# Patient Record
Sex: FEMALE | Race: BLACK OR AFRICAN AMERICAN
[De-identification: names, ages, dates, MRNs, and addresses within clinical notes are randomized per-mention and may not be internally consistent; named-entity substitution may affect disease eponyms.]

---

## 2020-07-24 ENCOUNTER — HOSPITAL ENCOUNTER (INPATIENT)
Dept: HOSPITAL 74 - YASAS | Age: 50
LOS: 5 days | Discharge: OTHER INSTITUTION | End: 2020-07-29
Attending: ALLERGY & IMMUNOLOGY | Admitting: ALLERGY & IMMUNOLOGY
Payer: COMMERCIAL

## 2020-07-24 VITALS — BODY MASS INDEX: 46.3 KG/M2

## 2020-07-24 DIAGNOSIS — F14.21: ICD-10-CM

## 2020-07-24 DIAGNOSIS — I48.91: ICD-10-CM

## 2020-07-24 DIAGNOSIS — F41.9: ICD-10-CM

## 2020-07-24 DIAGNOSIS — I10: ICD-10-CM

## 2020-07-24 DIAGNOSIS — F10.24: ICD-10-CM

## 2020-07-24 DIAGNOSIS — F32.9: ICD-10-CM

## 2020-07-24 DIAGNOSIS — H91.93: ICD-10-CM

## 2020-07-24 DIAGNOSIS — Z56.0: ICD-10-CM

## 2020-07-24 DIAGNOSIS — Z99.89: ICD-10-CM

## 2020-07-24 DIAGNOSIS — M17.0: ICD-10-CM

## 2020-07-24 DIAGNOSIS — F19.24: ICD-10-CM

## 2020-07-24 DIAGNOSIS — Z98.84: ICD-10-CM

## 2020-07-24 DIAGNOSIS — F10.282: ICD-10-CM

## 2020-07-24 DIAGNOSIS — D33.3: ICD-10-CM

## 2020-07-24 DIAGNOSIS — F10.230: Primary | ICD-10-CM

## 2020-07-24 LAB
ALBUMIN SERPL-MCNC: 3.6 G/DL (ref 3.4–5)
ALP SERPL-CCNC: 121 U/L (ref 45–117)
ALT SERPL-CCNC: 53 U/L (ref 13–61)
ANION GAP SERPL CALC-SCNC: 8 MMOL/L (ref 8–16)
AST SERPL-CCNC: 61 U/L (ref 15–37)
BILIRUB SERPL-MCNC: 0.9 MG/DL (ref 0.2–1)
BUN SERPL-MCNC: 9 MG/DL (ref 7–18)
CALCIUM SERPL-MCNC: 9.2 MG/DL (ref 8.5–10.1)
CHLORIDE SERPL-SCNC: 98 MMOL/L (ref 98–107)
CO2 SERPL-SCNC: 33 MMOL/L (ref 21–32)
CREAT SERPL-MCNC: 0.9 MG/DL (ref 0.55–1.3)
DEPRECATED RDW RBC AUTO: 16.3 % (ref 11.6–15.6)
GLUCOSE SERPL-MCNC: 106 MG/DL (ref 74–106)
HCT VFR BLD CALC: 39.4 % (ref 32.4–45.2)
HGB BLD-MCNC: 13 GM/DL (ref 10.7–15.3)
MCH RBC QN AUTO: 29.4 PG (ref 25.7–33.7)
MCHC RBC AUTO-ENTMCNC: 32.9 G/DL (ref 32–36)
MCV RBC: 89.4 FL (ref 80–96)
PLATELET # BLD AUTO: 144 K/MM3 (ref 134–434)
PMV BLD: 10.9 FL (ref 7.5–11.1)
POTASSIUM SERPLBLD-SCNC: 3.9 MMOL/L (ref 3.5–5.1)
PROT SERPL-MCNC: 7.3 G/DL (ref 6.4–8.2)
RBC # BLD AUTO: 4.41 M/MM3 (ref 3.6–5.2)
SODIUM SERPL-SCNC: 138 MMOL/L (ref 136–145)
WBC # BLD AUTO: 6.4 K/MM3 (ref 4–10)

## 2020-07-24 PROCEDURE — U0003 INFECTIOUS AGENT DETECTION BY NUCLEIC ACID (DNA OR RNA); SEVERE ACUTE RESPIRATORY SYNDROME CORONAVIRUS 2 (SARS-COV-2) (CORONAVIRUS DISEASE [COVID-19]), AMPLIFIED PROBE TECHNIQUE, MAKING USE OF HIGH THROUGHPUT TECHNOLOGIES AS DESCRIBED BY CMS-2020-01-R: HCPCS

## 2020-07-24 PROCEDURE — HZ2ZZZZ DETOXIFICATION SERVICES FOR SUBSTANCE ABUSE TREATMENT: ICD-10-PCS | Performed by: ALLERGY & IMMUNOLOGY

## 2020-07-24 PROCEDURE — G0009 ADMIN PNEUMOCOCCAL VACCINE: HCPCS

## 2020-07-24 RX ADMIN — IBUPROFEN PRN MG: 400 TABLET, FILM COATED ORAL at 17:40

## 2020-07-24 RX ADMIN — Medication SCH MG: at 22:18

## 2020-07-24 RX ADMIN — METOPROLOL TARTRATE SCH MG: 25 TABLET, FILM COATED ORAL at 22:17

## 2020-07-24 RX ADMIN — NICOTINE SCH: 7 PATCH TRANSDERMAL at 16:53

## 2020-07-24 RX ADMIN — Medication SCH MG: at 22:16

## 2020-07-24 SDOH — ECONOMIC STABILITY - INCOME SECURITY: UNEMPLOYMENT, UNSPECIFIED: Z56.0

## 2020-07-24 NOTE — HP
CIWA Score


Nausea/Vomitin-No Nausea/No Vomiting


Muscle Tremors: 3


Anxiety: 6


Agitation: 3


Paroxysmal Sweats: 1-Minimal Palms Moist


Orientation: 0-Oriented


Tacttile Disturbances: 2-Mild Itch/Numbness/Burn


Auditory Disturbances: 0-None


Visual Disturbances: 1-Very Mild Sensitivity


Headache: 0-None Present


CIWA-Ar Total Score: 16





- Admission Criteria


OASAS Guidelines: Admission for Medically Managed Detox: 


Requires at least one of the followin. CIWA greater than 12


2. Seizures within the past 24 hours


3. Delirium tremens within the past 24 hours


4. Hallucinations within the past 24 hours


5. Acute intervention needed for co  occurring medical disorder


6. Acute intervention needed for co  occurring psychiatric disorder


7. Severe withdrawal that cannot be handled at a lower level of care (continued


    vomiting, continued diarrhea, abnormal vital signs) requiring intravenous


    medication and/or fluids


8. Pregnancy








Admitting History and Physical





- Admission


Chief Complaint: " Im here to stop drinking."


History of Present Illness: 





 Patient is a 50 y/o F who presents to [facility] for alcohol detox. Patient 

endorses she consumes 8 glasses of vodka/whiskey daily. Last drink was 5:30 am 

today. Age of first use 16. Patient states she does require an eye opener and 

that she has blacked out multiple times in the past from drinking. 





PMH: Depression (Fluoxetine, Trazadone), HTN, Hearing loss (acoustic 

schwannoma), osteoarthritis b/l knees


Social Hx- Unemployed. Remote history of crack use- May of this year. 


SurgHx- Gastric Sleeve ([facility])


FH- Father Alcoholism, Leukemia. Mother Healthy. 




















 





Substance Use & Tx History





- Substance Use History


  ** Alcohol


Substance amount: 64 ounces vodka


Frequency of use: Daily


Substance route: Oral


Date of Last Use: 20


History Source: Patient


Limitations to Obtaining History: No Limitations





- Past Medical History


CNS: Yes: Other (Acoustic Schwanomma)


Cardiovascular: Yes: HTN


Pulmonary: No: Asthma, Bronchitis, Cancer, COPD, O2 Dependent, Pneumonia, 

Previously Intubated, Pulmonary Embolus, Pulmonary Fibrosis, Sleep Apnea, Other


Gastrointestinal: Yes: Other (Hernia. Unclear which type.)


Hepatobiliary: No: Cirrhosis, Cholelithiasis, Cholecystitis, 

Choledocholithiasis, Hepatitis A, Hepatitis B, Hepatitis C, Other


Renal/: No: Renal Failure, Renal Inusuff, BPH, Cancer, Hematuria, 

Hemodialysis, Neurogenic Bladder, Renal Calculi, UTI, Other


Reproductive: No: Ectopic Pregnancy, Endometriosis, Fibroids, PID, Polycystic 

Ovary Syndrome, Postmenopausal, Other


Heme/Onc: No: Anemia, B12 Deficiency, Bleeding Disorder, Cancer, Current 

Chemotherapy, Current Radiation Therapy, Hemochromatosis, Hypercoaguable State, 

Myeloproliferative Synd, Sickle Cell Disease, Sickle Cell Trait, 

Thrombocytopenia, Other


Infectious Disease: No: AIDS, C-Diff, Herpes Zoster, HIV, MRSA, STD's, 

Tuberculosis, VREF, Other


Psych: Yes: Depression


Musculoskeletal: Yes: Osteoarthritis


Rheumatology: No: Fibromyalgia, Gout, Lupus, Rheumatoid Arthritis, Sarcoidosis, 

Vasculitis, Other


ENT: No: Allergic Rhinitis, Sinusitis, Other


Endocrine: No: Fillmore's Disease, Cushing's Disease, Diabetes Insipidus, 

Diabetes Mellitus, Hyperparathyroidism, Hyperthyroidism, Hypothyroidism, 

Osteopenia, SIADH, Other





- Past Surgical History


Past Surgical History: Yes: Bypass, Cataract Removal





- Smoking History


Have you smoked in the past 12 months: No





- Alcohol/Substance Use


Hx Alcohol Use: Yes





Admission [facility]


Allergies/Adverse Reactions: 


                                    Allergies











Allergy/AdvReac Type Severity Reaction Status Date / Time


 


No Known Allergies Allergy   Verified 20 14:07











Exam Limitations: No Limitations





- Ebola screening


Have you traveled outside of the country in the last 21 days: No


Have you had contact with anyone from an Ebola affected area: No


Have you been sick,other than usual withdrawal symptoms: No


Do you have a fever: No





- Review of Systems


Constitutional: No Symptoms Reported


EENT: reports: Cataracts, Hearing Loss.  denies: Blurred Vision, Double Vision, 

Eye Pain, Ear Pain


Respiratory: denies: Cough, Orthopnea, Shortness of Breath, SOB with Exertion, 

Wheezing, Productive cough, Hemoptysis


Cardiac: denies: Chest Pain, Lightheadedness, Palpitations, Chest Tightness


GI: denies: Blood Streaked Bowels, Constipated, Diarrhea, Poor Appetite, Rectal 

Bleeding, Vomiting


: denies: Burning, Dysuria, Discharge, Flank Pain, Hematuria


Musculoskeletal: denies: Back Pain, Gout, Joint Pain, Muscle Pain, Muscle 

Weakness


Integumentary: denies: Bruising, Change in Color, Change in Hair/Nails, Dryness,

Erythema, Flushing


Neuro: denies: Headache, Numbness, Paresthesia, Seizure, Tingling, Tremors, 

Weakness


Endocrine: denies: Excessive Sweating, Flushing, Intolerance to Cold, 

Intolerance to Heat, Increased Hunger


Hematology: denies: Anemia, Blood Clots, Easy Bleeding, Easy Bruising


Psychiatric: reports: Orientated x3





Patient History





- Smoking Cessation


Smoking history: Former smoker


Have you smoked in the past 12 months: No


Initiated information on smoking cessation: No





Admission Physical Exam [facility]





- Physical


General Appearance: Yes: Within Normal Limits, Nourished, Appropriately Dressed


HEENTM: Yes: EOMI, Normal ENT Inspection, Normocephalic, Normal Voice


Respiratory: Yes: Chest Non-Tender, Lungs Clear, Normal Breath Sounds, No 

Respiratory Distress


Cardiology: Yes: Regular Rhythm, Regular Rate, S1, S2


Abdominal: Yes: Other (Obese).  No: Distended, Guarding, Rebound, Tenderness


Musculoskeletal: Yes: Other (Uses can to ambulate)


Extremities: Yes: Within Normal Limits


Neurological: Yes: CNs II-XII NML intact, Fully Oriented, Alert, Motor Strength 

5/5


Integumentary: Yes: Normal Color, Dry, Warm


Lymphatic: Yes: Within Normal Limits





- Diagnostic


(1) Alcohol abuse


Current Visit: Yes   Status: Acute   





(2) Hypertension


Current Visit: Yes   Status: Acute   





(3) Depression


Current Visit: Yes   Status: Chronic   





(4) Atrial fibrillation


Current Visit: Yes   Status: Chronic   





Breathalyzer





- Breathalyzer


Breathalyzer: 0





Urine Drug Screen





- Test Device


Lot number: [id number]


Expiration date: 21





- Control


Is test valid?: Yes





- Results


Drug screen NEGATIVE: Yes





Inpatient Rehab Admission





- Rehab Decision to Admit


Inpatient rehab admission?: No

## 2020-07-24 NOTE — BHS.RME
Substance Use & Tx History





- Substance Use History


  ** Alcohol


Substance amount: 64 ounces vodka


Frequency of use: Daily


Substance route: Oral


Date of Last Use: 20





Physical/Psych/Mental Status





- Behavior


General Behavior: Increased activity (restlessness, agitation)


Eye Contact: Normal





- Cooperativeness


Cooperativeness: Cooperative





- Thinking


Thought Processes: Tight, Logical, Goal Directed





- Physical Health Problems


Is patient presently having any pain?: No


Does patient presently have any injuries (include location): No


Does patient currently have a fever: No


Is patient pregnant: No





CIWA


Nausea/Vomitin


Muscle Tremors: 4-Moderate,w/Arms Extend


Anxiety: 2


Agitation: 2


Paroxysmal Sweats: 1-Minimal Palms Moist


Orientation: 0-Oriented


Tacttile Disturbances: 2-Mild Itch/Numbness/Burn


Auditory Disturbances: 0-None


Visual Disturbances: 1-Very Mild Sensitivity


Headache: 0-None Present


CIWA-Ar Total Score: 14

## 2020-07-24 NOTE — HP
CIWA Score


Nausea/Vomitin


Muscle Tremors: 4-Moderate,w/Arms Extend


Anxiety: 2


Agitation: 2


Paroxysmal Sweats: 1-Minimal Palms Moist


Orientation: 0-Oriented


Tacttile Disturbances: 2-Mild Itch/Numbness/Burn


Auditory Disturbances: 0-None


Visual Disturbances: 1-Very Mild Sensitivity


Headache: 0-None Present


CIWA-Ar Total Score: 14





- Admission Criteria


OASAS Guidelines: Admission for Medically Managed Detox: 


Requires at least one of the followin. CIWA greater than 12


2. Seizures within the past 24 hours


3. Delirium tremens within the past 24 hours


4. Hallucinations within the past 24 hours


5. Acute intervention needed for co  occurring medical disorder


6. Acute intervention needed for co  occurring psychiatric disorder


7. Severe withdrawal that cannot be handled at a lower level of care (continued


    vomiting, continued diarrhea, abnormal vital signs) requiring intravenous


    medication and/or fluids


8. Pregnancy








Admitting History and Physical





- Admission


History Source: Patient


Limitations to Obtaining History: No Limitations





- Past Medical History


Pulmonary: Yes: Asthma





- Past Surgical History


Past Surgical History: Yes: None





- Smoking History


Smoking history: Current every day smoker


Have you smoked in the past 12 months: Yes


Aproximately how many cigarettes per day: 20





- Alcohol/Substance Use


Hx Alcohol Use: Yes


Number of Drinks Daily: 20


History of Substance Use: reports: Cocaine





- Social History


Usual Living Arrangement: Yes: Other


Do you think of yourself as: Straight/Heterosexual


ADL: Independent


Occupation: unemployed


History of Recent Travel: No





Admission ROS S





- HPI


Exam Limitations: No Limitations





- Ebola screening


Have you traveled outside of the country in the last 21 days: No


Have you had contact with anyone from an Ebola affected area: No


Have you been sick,other than usual withdrawal symptoms: No


Do you have a fever: No





- Review of Systems


Constitutional: Chills, Diaphoresis


EENT: reports: No Symptoms Reported


Respiratory: reports: No Symptoms reported


Cardiac: reports: No Symptoms Reported


GI: reports: No Symptoms Reported


: reports: No Symptoms Reported


Musculoskeletal: reports: No Symptoms Reported


Integumentary: reports: No Symptoms Reported


Neuro: reports: No Symptoms reported


Endocrine: reports: No Symptoms Reported


Hematology: reports: No Symptoms Reported


Psychiatric: reports: Judgement Intact, Mood/Affect Appropiate, Orientated x3, 

Agitated, Anxious


Other Systems: Reviewed and Negative





Patient History





- Patient Medical History


Hx Anemia: No


Hx Asthma: Yes


Hx Chronic Obstructive Pulmonary Disease (COPD): No


Hx Cancer: No


Hx Cardiac Disorders: No


Hx Congestive Heart Failure: No


Hx Hypertension: No


Hx Hypercholesterolemia: No


Hx Pacemaker: No


HX Cerebrovascular Accident: No


Hx Seizures: No


Hx Dementia: No


Hx Diabetes: No


Hx Gastrointestinal Disorders: No


Hx Liver Disease: No


Hx Genitourinary Disorders: No


Hx Sexually Transmitted Disorders: No


Hx Renal Disease (ESRD): No


Hx Thyroid Disease: No


Hx Human Immunodeficiency Virus (HIV): No


Hx Hepatitis C: No


Hx Depression: No


Hx Suicide Attempt: No


Hx Bipolar Disorder: No


Hx Schizophrenia: No





- Patient Surgical History


Past Surgical History: No





- PPD History


Previous Implant?: Yes


Documented Results: Negative w/o proof


Implanted On Prior [facility] Admission?: No


PPD to be Administered?: Yes





- Smoking Cessation


Smoking history: Current every day smoker


Have you smoked in the past 12 months: Yes


Aproximately how many cigarettes per day: 20


Hx Chewing Tobacco Use: No


Initiated information on smoking cessation: Yes


'Breaking Loose' booklet given: 20





- Substances abused


  ** Alcohol


Substance route: Oral


Frequency: Daily


Amount used: 3 pints vodka + beers


Age of first use: 11


Date of last use: 20 (3am beers)





  ** Cocaine


Substance route: Inhalation


Frequency: Daily


Amount used: 1 gram


Age of first use: 11


Date of last use: 20





Admission Physical Exam [facility]





- Physical


General Appearance: Yes: Mild Distress, Thin, Tremorous, Irritable, Anxious


HEENTM: Yes: EOMI, Hearing grossly Normal, Normal ENT Inspection, Normocephalic,

Normal Voice, THA, Pharynx Normal, Tm's normal


Respiratory: Yes: Chest Non-Tender, Lungs Clear, Normal Breath Sounds, No 

Respiratory Distress, No Accessory Muscle Use


Neck: Yes: No masses,lesions,Nodules, Supple, Trachea in good position


Breast: Yes: Within Normal Limits


Cardiology: Yes: Regular Rhythm, Regular Rate, S1, S2


Abdominal: Yes: Normal Bowel Sounds, Non Tender, Flat, Soft


Genitourinary: Yes: Within Normal Limits


Back: Yes: Normal Inspection


Musculoskeletal: Yes: full range of Motion, Gait Steady, Pelvis Stable


Extremities: Yes: Normal Capillary Refill, Normal Inspection, Normal Range of 

Motion, Non-Tender


Neurological: Yes: CNs II-XII NML intact, Fully Oriented, Alert, Motor Strength 

5/5, Normal Mood/Affect, Normal Response


Integumentary: Yes: Normal Color, Dry, Warm


Lymphatic: Yes: Within Normal Limits





Cleared for Admission [facility]





- Detox or Rehab


[facility] Level of Care: Medically Managed


Detox Regimen/Protocol: Librium


Claeared for Rehab Admission: No





Screened but not Admitted





- Documentation of Visit


Screened but not Admitted: No





Breathalyzer





- Breathalyzer


Breathalyzer: 0





Inpatient Rehab Admission





- Rehab Decision to Admit


Inpatient rehab admission?: No

## 2020-07-25 RX ADMIN — Medication SCH MG: at 22:14

## 2020-07-25 RX ADMIN — METHOCARBAMOL PRN MG: 500 TABLET ORAL at 13:35

## 2020-07-25 RX ADMIN — METOPROLOL TARTRATE SCH MG: 25 TABLET, FILM COATED ORAL at 10:43

## 2020-07-25 RX ADMIN — METOPROLOL TARTRATE SCH MG: 25 TABLET, FILM COATED ORAL at 22:14

## 2020-07-25 RX ADMIN — MULTIVITAMIN TABLET SCH TAB: TABLET at 10:44

## 2020-07-25 RX ADMIN — IBUPROFEN PRN MG: 400 TABLET, FILM COATED ORAL at 06:00

## 2020-07-25 RX ADMIN — METHOCARBAMOL PRN MG: 500 TABLET ORAL at 05:59

## 2020-07-25 RX ADMIN — NICOTINE SCH: 7 PATCH TRANSDERMAL at 10:43

## 2020-07-25 NOTE — PN
[facility] Progress Note


Note: 





Psychiatry   


Attending's note :


Three visits at bedside.


Different times. Patient asleep.


Nursing staff made aware.


Liaison-Psychiatry will follow.

## 2020-07-25 NOTE — PN
[facility] Progress Note


Note: 





Psychiatry   


Attending's note :


Bedside visit for psychiatric interview.


Patient is found asleep. No distress noted. 


Examination deferred until patient awake.

## 2020-07-25 NOTE — PN
[facility] CIWA





- CIWA Score


Nausea/Vomiting: 3 (and Diarrhea.)


Muscle Tremors: None


Anxiety: 3


Agitation: 2


Paroxysmal Sweats: No Perspiration


Orientation: 0-Oriented


Tacttile Disturbances: 0-None


Auditory Disturbances: 1-Very Mild


Visual Disturbances: 2-Mild Sensitivity


Headache: 0-None Present


CIWA-Ar Total Score: 11





[facility] Progress Note (SOAP)


Subjective: 





Nausea, Diarrhea, Anxious, Fatigue.


Objective: 


Patient A & O X 3, Observed Ambulating on Detox Unit Unassisted. In No Acute 

Distress.





07/25/20 16:28





                                   Vital Signs











Temperature  96.9 F L  07/25/20 14:12


 


Pulse Rate  89   07/25/20 14:12


 


Respiratory Rate  18   07/25/20 14:12


 


Blood Pressure  134/68   07/25/20 14:12


 


O2 Sat by Pulse Oximetry (%)  96   07/25/20 14:12











                                Laboratory Tests











  07/24/20 07/24/20 07/24/20





  14:15 14:15 14:15


 


WBC  6.4  


 


RBC  4.41  


 


Hgb  13.0  


 


Hct  39.4  


 


MCV  89.4  


 


MCH  29.4  


 


MCHC  32.9  


 


RDW  16.3 H  


 


Plt Count  144  


 


MPV  10.9  


 


Sodium   138 


 


Potassium   3.9 


 


Chloride   98 


 


Carbon Dioxide   33 H 


 


Anion Gap   8 


 


BUN   9.0 


 


Creatinine   0.9 


 


Est GFR (CKD-EPI)AfAm   85.80 


 


Est GFR (CKD-EPI)NonAf   74.03 


 


Random Glucose   106 


 


Hemoglobin A1c %   


 


Calcium   9.2 


 


Total Bilirubin   0.9 


 


AST   61 H 


 


ALT   53 


 


Alkaline Phosphatase   121 H 


 


Total Protein   7.3 


 


Albumin   3.6 


 


Syphilis Serology    Non-reactive


 


HIV Ag/Ab Combo Qual   














  07/24/20 07/25/20





  14:15 06:05


 


WBC  


 


RBC  


 


Hgb  


 


Hct  


 


MCV  


 


MCH  


 


MCHC  


 


RDW  


 


Plt Count  


 


MPV  


 


Sodium  


 


Potassium  


 


Chloride  


 


Carbon Dioxide  


 


Anion Gap  


 


BUN  


 


Creatinine  


 


Est GFR (CKD-EPI)AfAm  


 


Est GFR (CKD-EPI)NonAf  


 


Random Glucose  


 


Hemoglobin A1c %  4.8 


 


Calcium  


 


Total Bilirubin  


 


AST  


 


ALT  


 


Alkaline Phosphatase  


 


Total Protein  


 


Albumin  


 


Syphilis Serology  


 


HIV Ag/Ab Combo Qual   Negative











Lab Results noted.


Assessment: 





07/25/20 16:29


WITHDRAWAL SYMPTOMS.


ELEVATED AST LEVEL.


Plan: 





Continue Detox.


Increase Daily Oral Water Intake.


PRN Zofran SL for nausea.


PRN Pepto-Bismol for Diarrhea.

## 2020-07-26 RX ADMIN — METOPROLOL TARTRATE SCH MG: 25 TABLET, FILM COATED ORAL at 10:23

## 2020-07-26 RX ADMIN — TRAZODONE HYDROCHLORIDE SCH MG: 50 TABLET ORAL at 22:16

## 2020-07-26 RX ADMIN — MULTIVITAMIN TABLET SCH TAB: TABLET at 10:23

## 2020-07-26 RX ADMIN — NICOTINE SCH: 7 PATCH TRANSDERMAL at 10:24

## 2020-07-26 RX ADMIN — Medication SCH MG: at 22:15

## 2020-07-26 RX ADMIN — METOPROLOL TARTRATE SCH MG: 25 TABLET, FILM COATED ORAL at 22:15

## 2020-07-26 RX ADMIN — METHOCARBAMOL PRN MG: 500 TABLET ORAL at 10:27

## 2020-07-26 RX ADMIN — METHOCARBAMOL PRN MG: 500 TABLET ORAL at 22:16
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
## 2020-07-26 NOTE — PN
S CIWA





- CIWA Score


Nausea/Vomitin-Mild Nausea/No Vomiting


Muscle Tremors: 2


Anxiety: 1-Mildly Anxious


Agitation: 1-Slight > Activity


Paroxysmal Sweats: 1-Minimal Palms Moist


Orientation: 0-Oriented


Tacttile Disturbances: 0-None


Auditory Disturbances: 0-None


Visual Disturbances: 2-Mild Sensitivity


Headache: 1-Very Mild


CIWA-Ar Total Score: 9





[facility] Progress Note (SOAP)


Subjective: 





49 years old female admitted on 20 for alcohol withdrawal sx management 

treating with librium detox regiment 


ate breakfast and lunch in room ambulating with cane slow steady 


social with peers in day room 


discussing aftercare with staff ms simmons prefers to go to [facility] for alcohol 

recovery 


Objective: 





20 13:51


                               Vital Signs - 24 hr











  20





  14:12 17:51 21:00


 


Temperature 96.9 F L 98.0 F 97.3 F L


 


Pulse Rate 89 91 H 80


 


Respiratory 18 18 18





Rate   


 


Blood Pressure 134/68 101/67 105/64


 


O2 Sat by Pulse 96  95





Oximetry (%)   














  20





  07:02 08:37 12:18


 


Temperature 96.9 F L 96.9 F L 96.9 F L


 


Pulse Rate 81 91 H 96 H


 


Respiratory 20 18 18





Rate   


 


Blood Pressure 105/69 105/73 116/70


 


O2 Sat by Pulse 97  97





Oximetry (%)   








                                Laboratory Tests











  20





  14:15 14:15 14:15


 


WBC  6.4  


 


RBC  4.41  


 


Hgb  13.0  


 


Hct  39.4  


 


MCV  89.4  


 


MCH  29.4  


 


MCHC  32.9  


 


RDW  16.3 H  


 


Plt Count  144  


 


MPV  10.9  


 


Sodium   138 


 


Potassium   3.9 


 


Chloride   98 


 


Carbon Dioxide   33 H 


 


Anion Gap   8 


 


BUN   9.0 


 


Creatinine   0.9 


 


Est GFR (CKD-EPI)AfAm   85.80 


 


Est GFR (CKD-EPI)NonAf   74.03 


 


Random Glucose   106 


 


Hemoglobin A1c %   


 


Calcium   9.2 


 


Total Bilirubin   0.9 


 


AST   61 H 


 


ALT   53 


 


Alkaline Phosphatase   121 H 


 


Total Protein   7.3 


 


Albumin   3.6 


 


Syphilis Serology    Non-reactive


 


HIV Ag/Ab Combo Qual   














  20





  14:15 06:05


 


WBC  


 


RBC  


 


Hgb  


 


Hct  


 


MCV  


 


MCH  


 


MCHC  


 


RDW  


 


Plt Count  


 


MPV  


 


Sodium  


 


Potassium  


 


Chloride  


 


Carbon Dioxide  


 


Anion Gap  


 


BUN  


 


Creatinine  


 


Est GFR (CKD-EPI)AfAm  


 


Est GFR (CKD-EPI)NonAf  


 


Random Glucose  


 


Hemoglobin A1c %  4.8 


 


Calcium  


 


Total Bilirubin  


 


AST  


 


ALT  


 


Alkaline Phosphatase  


 


Total Protein  


 


Albumin  


 


Syphilis Serology  


 


HIV Ag/Ab Combo Qual   Negative








lab noted


Assessment: 





20 13:52


alcohol withdrawal 


Plan: 





librium regiment

## 2020-07-26 NOTE — EKG
Test Reason : 

Blood Pressure : ***/*** mmHG

Vent. Rate : 074 BPM     Atrial Rate : 074 BPM

   P-R Int : 160 ms          QRS Dur : 096 ms

    QT Int : 460 ms       P-R-T Axes : 046 024 023 degrees

   QTc Int : 510 ms

 

NORMAL SINUS RHYTHM

POSSIBLE LEFT ATRIAL ENLARGEMENT

NONSPECIFIC ST ABNORMALITY

PROLONGED QT

ABNORMAL ECG

NO PREVIOUS ECGS AVAILABLE

Confirmed by MD Bañuelos Edward ([id number]) on 7/26/2020 5:14:25 PM

 

Referred By:             Confirmed By:Jesu Bañuelos MD

## 2020-07-27 RX ADMIN — METOPROLOL TARTRATE SCH MG: 25 TABLET, FILM COATED ORAL at 22:11

## 2020-07-27 RX ADMIN — MULTIVITAMIN TABLET SCH TAB: TABLET at 10:21

## 2020-07-27 RX ADMIN — Medication SCH MG: at 22:10

## 2020-07-27 RX ADMIN — TRAZODONE HYDROCHLORIDE SCH MG: 50 TABLET ORAL at 22:10

## 2020-07-27 RX ADMIN — METOPROLOL TARTRATE SCH: 25 TABLET, FILM COATED ORAL at 10:21

## 2020-07-27 RX ADMIN — ALUMINUM HYDROXIDE, MAGNESIUM HYDROXIDE, AND SIMETHICONE PRN ML: 200; 200; 20 SUSPENSION ORAL at 17:25

## 2020-07-27 RX ADMIN — NICOTINE SCH: 7 PATCH TRANSDERMAL at 10:22

## 2020-07-27 RX ADMIN — ALUMINUM HYDROXIDE, MAGNESIUM HYDROXIDE, AND SIMETHICONE PRN ML: 200; 200; 20 SUSPENSION ORAL at 11:32

## 2020-07-27 RX ADMIN — Medication SCH MG: at 22:11

## 2020-07-27 NOTE — PN
S CIWA





- CIWA Score


Nausea/Vomitin-No Nausea/No Vomiting


Muscle Tremors: 2


Anxiety: 3


Agitation: 1-Slight > Activity


Paroxysmal Sweats: No Perspiration


Orientation: 0-Oriented


Tacttile Disturbances: 0-None


Auditory Disturbances: 0-None


Visual Disturbances: 1-Very Mild Sensitivity


Headache: 0-None Present


CIWA-Ar Total Score: 7





BHS Progress Note (SOAP)


Subjective: 





49 years old grossly obese female admitted on 20 for alcohol withdrawal sx

management treating with librium detox regiment


ate breakfast 


tolerated food well





ambulating with four points cane slow steady from bed to bathroom alert oriented

x 3 speech clearly  





loose stool x 1 after breakfast 


imodium 4 mg po x 1


Objective: 





20 09:00


                               Vital Signs - 24 hr











  20





  12:18 16:58 20:49


 


Temperature 96.9 F L 97.5 F L 96.8 F L


 


Pulse Rate 96 H 79 79


 


Respiratory 18 18 16





Rate   


 


Blood Pressure 116/70  125/78


 


O2 Sat by Pulse 97  96





Oximetry (%)   














  20





  06:35


 


Temperature 97.1 F L


 


Pulse Rate 77


 


Respiratory 18





Rate 


 


Blood Pressure 103/65


 


O2 Sat by Pulse 96





Oximetry (%) 








                                Laboratory Tests











  20





  14:15 14:15 14:15


 


WBC  6.4  


 


RBC  4.41  


 


Hgb  13.0  


 


Hct  39.4  


 


MCV  89.4  


 


MCH  29.4  


 


MCHC  32.9  


 


RDW  16.3 H  


 


Plt Count  144  


 


MPV  10.9  


 


Sodium   138 


 


Potassium   3.9 


 


Chloride   98 


 


Carbon Dioxide   33 H 


 


Anion Gap   8 


 


BUN   9.0 


 


Creatinine   0.9 


 


Est GFR (CKD-EPI)AfAm   85.80 


 


Est GFR (CKD-EPI)NonAf   74.03 


 


Random Glucose   106 


 


Hemoglobin A1c %   


 


Calcium   9.2 


 


Total Bilirubin   0.9 


 


AST   61 H 


 


ALT   53 


 


Alkaline Phosphatase   121 H 


 


Total Protein   7.3 


 


Albumin   3.6 


 


Syphilis Serology    Non-reactive


 


HIV Ag/Ab Combo Qual   














  20





  14:15 06:05


 


WBC  


 


RBC  


 


Hgb  


 


Hct  


 


MCV  


 


MCH  


 


MCHC  


 


RDW  


 


Plt Count  


 


MPV  


 


Sodium  


 


Potassium  


 


Chloride  


 


Carbon Dioxide  


 


Anion Gap  


 


BUN  


 


Creatinine  


 


Est GFR (CKD-EPI)AfAm  


 


Est GFR (CKD-EPI)NonAf  


 


Random Glucose  


 


Hemoglobin A1c %  4.8 


 


Calcium  


 


Total Bilirubin  


 


AST  


 


ALT  


 


Alkaline Phosphatase  


 


Total Protein  


 


Albumin  


 


Syphilis Serology  


 


HIV Ag/Ab Combo Qual   Negative








lab noted


20 09:01


covid pending 


Assessment: 





20 09:01


alcohol withdrawal  


Plan: 





librium regiment


seen by psychiatrist resume prozac and trazodone

## 2020-07-28 VITALS — TEMPERATURE: 97.1 F

## 2020-07-28 RX ADMIN — Medication SCH MG: at 22:51

## 2020-07-28 RX ADMIN — TRAZODONE HYDROCHLORIDE SCH MG: 50 TABLET ORAL at 22:46

## 2020-07-28 RX ADMIN — METOPROLOL TARTRATE SCH MG: 25 TABLET, FILM COATED ORAL at 10:06

## 2020-07-28 RX ADMIN — ALUMINUM HYDROXIDE, MAGNESIUM HYDROXIDE, AND SIMETHICONE PRN ML: 200; 200; 20 SUSPENSION ORAL at 10:08

## 2020-07-28 RX ADMIN — METOPROLOL TARTRATE SCH MG: 25 TABLET, FILM COATED ORAL at 22:49

## 2020-07-28 RX ADMIN — Medication SCH MG: at 22:46

## 2020-07-28 RX ADMIN — NICOTINE SCH: 7 PATCH TRANSDERMAL at 10:07

## 2020-07-28 RX ADMIN — MULTIVITAMIN TABLET SCH TAB: TABLET at 10:06

## 2020-07-28 NOTE — PN
S CIWA





- CIWA Score


Nausea/Vomitin-No Nausea/No Vomiting


Muscle Tremors: 1-None Visible, but Felt


Anxiety: 1-Mildly Anxious


Agitation: 0-Normal Activity


Paroxysmal Sweats: 1-Minimal Palms Moist


Orientation: 0-Oriented


Tacttile Disturbances: 0-None


Auditory Disturbances: 0-None


Visual Disturbances: 1-Very Mild Sensitivity


Headache: 0-None Present


CIWA-Ar Total Score: 4





BHS Progress Note (SOAP)


Subjective: 





49 years old female admitted on 20 for alcohol withdrawal sx management 

treating with librium detox regiment


ambulating with cane slow steady from bed to bathroom


feeling better today discussing aftercare with staff 


encourage [facility] 


Objective: 





20 09:47


                               Vital Signs - 24 hr











  20





  12:43 16:40 20:54


 


Temperature 96.6 F L 96.9 F L 97.3 F L


 


Pulse Rate 89 73 72


 


Respiratory 20 18 16





Rate   


 


Blood Pressure 125/84 124/84 113/81


 


O2 Sat by Pulse 97 97 97





Oximetry (%)   














  20





  06:46 08:35


 


Temperature 97.7 F 98.4 F


 


Pulse Rate 69 65


 


Respiratory 20 18





Rate  


 


Blood Pressure 123/83 126/82


 


O2 Sat by Pulse 99 99





Oximetry (%)  








                                Laboratory Tests











  20





  13:26 14:15 14:15


 


WBC   6.4 


 


RBC   4.41 


 


Hgb   13.0 


 


Hct   39.4 


 


MCV   89.4 


 


MCH   29.4 


 


MCHC   32.9 


 


RDW   16.3 H 


 


Plt Count   144 


 


MPV   10.9 


 


Sodium    138


 


Potassium    3.9


 


Chloride    98


 


Carbon Dioxide    33 H


 


Anion Gap    8


 


BUN    9.0


 


Creatinine    0.9


 


Est GFR (CKD-EPI)AfAm    85.80


 


Est GFR (CKD-EPI)NonAf    74.03


 


Random Glucose    106


 


Hemoglobin A1c %   


 


Calcium    9.2


 


Total Bilirubin    0.9


 


AST    61 H


 


ALT    53


 


Alkaline Phosphatase    121 H


 


Total Protein    7.3


 


Albumin    3.6


 


POC Urine HCG, Qual  Negative  


 


Syphilis Serology   


 


COVID-19 (GAGE)   


 


HIV Ag/Ab Combo Qual   














  20





  14:15 14:15 15:20


 


WBC   


 


RBC   


 


Hgb   


 


Hct   


 


MCV   


 


MCH   


 


MCHC   


 


RDW   


 


Plt Count   


 


MPV   


 


Sodium   


 


Potassium   


 


Chloride   


 


Carbon Dioxide   


 


Anion Gap   


 


BUN   


 


Creatinine   


 


Est GFR (CKD-EPI)AfAm   


 


Est GFR (CKD-EPI)NonAf   


 


Random Glucose   


 


Hemoglobin A1c %   4.8 


 


Calcium   


 


Total Bilirubin   


 


AST   


 


ALT   


 


Alkaline Phosphatase   


 


Total Protein   


 


Albumin   


 


POC Urine HCG, Qual   


 


Syphilis Serology  Non-reactive  


 


COVID-19 (GAGE)    Not detected


 


HIV Ag/Ab Combo Qual   














  20





  06:05


 


WBC 


 


RBC 


 


Hgb 


 


Hct 


 


MCV 


 


MCH 


 


MCHC 


 


RDW 


 


Plt Count 


 


MPV 


 


Sodium 


 


Potassium 


 


Chloride 


 


Carbon Dioxide 


 


Anion Gap 


 


BUN 


 


Creatinine 


 


Est GFR (CKD-EPI)AfAm 


 


Est GFR (CKD-EPI)NonAf 


 


Random Glucose 


 


Hemoglobin A1c % 


 


Calcium 


 


Total Bilirubin 


 


AST 


 


ALT 


 


Alkaline Phosphatase 


 


Total Protein 


 


Albumin 


 


POC Urine HCG, Qual 


 


Syphilis Serology 


 


COVID-19 (GAGE) 


 


HIV Ag/Ab Combo Qual  Negative








encourage weight loss 


Assessment: 





20 09:48


alcohol withdrawal 


Plan: 





librium regiment

## 2020-07-29 ENCOUNTER — HOSPITAL ENCOUNTER (INPATIENT)
Dept: HOSPITAL 74 - YASAS | Age: 50
LOS: 21 days | Discharge: HOME | DRG: 772 | End: 2020-08-19
Attending: ALLERGY & IMMUNOLOGY | Admitting: ALLERGY & IMMUNOLOGY
Payer: COMMERCIAL

## 2020-07-29 VITALS — DIASTOLIC BLOOD PRESSURE: 88 MMHG | SYSTOLIC BLOOD PRESSURE: 128 MMHG | HEART RATE: 71 BPM

## 2020-07-29 DIAGNOSIS — M17.0: ICD-10-CM

## 2020-07-29 DIAGNOSIS — H91.93: ICD-10-CM

## 2020-07-29 DIAGNOSIS — F32.9: ICD-10-CM

## 2020-07-29 DIAGNOSIS — F10.282: ICD-10-CM

## 2020-07-29 DIAGNOSIS — Z98.84: ICD-10-CM

## 2020-07-29 DIAGNOSIS — F10.24: ICD-10-CM

## 2020-07-29 DIAGNOSIS — F14.21: ICD-10-CM

## 2020-07-29 DIAGNOSIS — D33.3: ICD-10-CM

## 2020-07-29 DIAGNOSIS — Z56.0: ICD-10-CM

## 2020-07-29 DIAGNOSIS — I10: ICD-10-CM

## 2020-07-29 DIAGNOSIS — E78.5: ICD-10-CM

## 2020-07-29 DIAGNOSIS — F10.20: Primary | ICD-10-CM

## 2020-07-29 DIAGNOSIS — Z91.410: ICD-10-CM

## 2020-07-29 DIAGNOSIS — I48.20: ICD-10-CM

## 2020-07-29 PROCEDURE — HZ42ZZZ GROUP COUNSELING FOR SUBSTANCE ABUSE TREATMENT, COGNITIVE-BEHAVIORAL: ICD-10-PCS | Performed by: ALLERGY & IMMUNOLOGY

## 2020-07-29 RX ADMIN — MULTIVITAMIN TABLET SCH TAB: TABLET at 10:25

## 2020-07-29 RX ADMIN — METOPROLOL TARTRATE SCH MG: 25 TABLET, FILM COATED ORAL at 10:25

## 2020-07-29 RX ADMIN — Medication SCH MG: at 21:23

## 2020-07-29 RX ADMIN — NICOTINE SCH: 7 PATCH TRANSDERMAL at 10:26

## 2020-07-29 RX ADMIN — TRAZODONE HYDROCHLORIDE SCH MG: 50 TABLET ORAL at 21:24

## 2020-07-29 SDOH — ECONOMIC STABILITY - INCOME SECURITY: UNEMPLOYMENT, UNSPECIFIED: Z56.0
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
## 2020-07-29 NOTE — DS
[facility]HS Detox Discharge Summary


Admission Date: 


20





Discharge Date: 20





- History


Present History: Alcohol Dependence


Additional Comments: 





49 years old female admitted on 20 for alcohol withdrawal sx management 

treated with librium detox regiment


seen by psychiatrist resume prozac and trazodone


ms iris has completed the librium regiment and is tolerated well


alert oriented x 3 speech clearly coherently ambulating with cane slow steady 

gaits 





respiratory clear lung sounds bilaterally on auscultation


abdomen soft round obese no rebound tenderness


skin warm and dry 


Pertinent Past History: 





time for discharge 40 minutes


transferred order sent from detox to rehab 





- Physical Exam Results


Vital Signs: 


                                   Vital Signs











Temperature  97.1 F L  20 08:51


 


Pulse Rate  71   20 08:51


 


Respiratory Rate  18   20 08:51


 


Blood Pressure  128/88   20 08:51


 


O2 Sat by Pulse Oximetry (%)  96   20 05:58











Pertinent Admission Physical Exam Findings: 





alcohol withdrawal 


                                Laboratory Tests











  20





  13:26 14:15 14:15


 


WBC   6.4 


 


RBC   4.41 


 


Hgb   13.0 


 


Hct   39.4 


 


MCV   89.4 


 


MCH   29.4 


 


MCHC   32.9 


 


RDW   16.3 H 


 


Plt Count   144 


 


MPV   10.9 


 


Sodium    138


 


Potassium    3.9


 


Chloride    98


 


Carbon Dioxide    33 H


 


Anion Gap    8


 


BUN    9.0


 


Creatinine    0.9


 


Est GFR (CKD-EPI)AfAm    85.80


 


Est GFR (CKD-EPI)NonAf    74.03


 


Random Glucose    106


 


Hemoglobin A1c %   


 


Calcium    9.2


 


Total Bilirubin    0.9


 


AST    61 H


 


ALT    53


 


Alkaline Phosphatase    121 H


 


Total Protein    7.3


 


Albumin    3.6


 


POC Urine HCG, Qual  Negative  


 


Syphilis Serology   


 


COVID-19 (GAGE)   


 


HIV Ag/Ab Combo Qual   














  20





  14:15 14:15 15:20


 


WBC   


 


RBC   


 


Hgb   


 


Hct   


 


MCV   


 


MCH   


 


MCHC   


 


RDW   


 


Plt Count   


 


MPV   


 


Sodium   


 


Potassium   


 


Chloride   


 


Carbon Dioxide   


 


Anion Gap   


 


BUN   


 


Creatinine   


 


Est GFR (CKD-EPI)AfAm   


 


Est GFR (CKD-EPI)NonAf   


 


Random Glucose   


 


Hemoglobin A1c %   4.8 


 


Calcium   


 


Total Bilirubin   


 


AST   


 


ALT   


 


Alkaline Phosphatase   


 


Total Protein   


 


Albumin   


 


POC Urine HCG, Qual   


 


Syphilis Serology  Non-reactive  


 


COVID-19 (GAGE)    Not detected


 


HIV Ag/Ab Combo Qual   














  20





  06:05


 


WBC 


 


RBC 


 


Hgb 


 


Hct 


 


MCV 


 


MCH 


 


MCHC 


 


RDW 


 


Plt Count 


 


MPV 


 


Sodium 


 


Potassium 


 


Chloride 


 


Carbon Dioxide 


 


Anion Gap 


 


BUN 


 


Creatinine 


 


Est GFR (CKD-EPI)AfAm 


 


Est GFR (CKD-EPI)NonAf 


 


Random Glucose 


 


Hemoglobin A1c % 


 


Calcium 


 


Total Bilirubin 


 


AST 


 


ALT 


 


Alkaline Phosphatase 


 


Total Protein 


 


Albumin 


 


POC Urine HCG, Qual 


 


Syphilis Serology 


 


COVID-19 (GAGE) 


 


HIV Ag/Ab Combo Qual  Negative








lab noted





- Treatment


Hospital Course: Detox Protocol Followed, Detoxed Safely, Responded well, 

Discharged Condition Good, Rehab Referral Accepted


Patient has Accepted [facility] Referral to: [facility] 





- Medication


Discharge Medications: 


Ambulatory Orders





Calcium Carbonate/Vitamin D3 [Calcium 500-Vit D3 400 Tablet] 1 tab PO DAILY 

20 


Cyanocobalamin [Vitamin B12 -] 500 mcg PO DAILY 20 


Fluoxetine HCl [Prozac] 40 mg PO DAILY 20 


Gabapentin 300 mg PO TID 20 


Iron Polysaccharide Complex [Ferric X-150] 150 mg PO DAILY 20 


Metoprolol Tartrate 25 mg PO BID 20 


Multivitamins [Multivit ([facility] Formulary)] 1 tab PO DAILY 20 


traZODone HCL [Trazodone HCl] 50 mg PO HS 20 











- Diagnosis


(1) Substance induced mood disorder


Current Visit: Yes   Status: Suspected   





(2) Alcohol dependence, uncomplicated


Current Visit: Yes   Status: Acute   





(3) Hypertension


Current Visit: Yes   Status: Chronic   


Qualifiers: 


   Hypertension type: essential hypertension   Qualified Code(s): I10 - 

Essential (primary) hypertension   





- AMA


Did Patient Leave Against Medical Advice: No





CIWA Score





- CIWA Score


Nausea/Vomitin-No Nausea/No Vomiting


Muscle Tremors: 1-None Visible, but Felt


Anxiety: 1-Mildly Anxious


Agitation: 0-Normal Activity


Paroxysmal Sweats: No Perspiration


Orientation: 0-Oriented


Tacttile Disturbances: 0-None


Auditory Disturbances: 0-None


Visual Disturbances: 0-None


Headache: 0-None Present


CIWA-Ar Total Score: 2

## 2020-07-29 NOTE — HP
BHS MD Rehab Assess/Revision





- Admission History


Ad[facility]hab from: [facility]


Date of Admission to Rehab: 07/29/20





- Findings


Detox History & Physical reviewed: Yes


Concur with findings: Yes


Comments/Additional Findings: trasnferred from detox to rehab admission as per 

protocol





Inpatient Rehab Admission





- Rehab Decision to Admit


Inpatient rehab admission?: Yes





- Initial Determination


Are CD services needed?: Yes


Free of communicable disease: Yes


Not in need of hospitalization: Yes





- Rehab Admission Criteria


Previous failed treatment: Yes


Poor recovery environment: Yes


Comorbidities: Yes


Lacks judgement: Yes


Patient is meeting Inpatient Rehab admission criteria:: Yes

## 2020-07-30 RX ADMIN — ALUMINUM HYDROXIDE, MAGNESIUM HYDROXIDE, AND SIMETHICONE PRN ML: 200; 200; 20 SUSPENSION ORAL at 09:37

## 2020-07-30 RX ADMIN — Medication SCH MG: at 21:06

## 2020-07-30 RX ADMIN — Medication SCH TAB: at 09:36

## 2020-07-30 RX ADMIN — TRAZODONE HYDROCHLORIDE SCH MG: 50 TABLET ORAL at 21:06

## 2020-07-30 RX ADMIN — ACETAMINOPHEN PRN MG: 325 TABLET ORAL at 09:37

## 2020-07-30 RX ADMIN — NICOTINE SCH: 7 PATCH TRANSDERMAL at 09:35

## 2020-07-30 RX ADMIN — PANTOPRAZOLE SODIUM SCH MG: 40 TABLET, DELAYED RELEASE ORAL at 10:11

## 2020-07-30 NOTE — EKG
Test Reason : 

Blood Pressure : ***/*** mmHG

Vent. Rate : 085 BPM     Atrial Rate : 085 BPM

   P-R Int : 168 ms          QRS Dur : 094 ms

    QT Int : 424 ms       P-R-T Axes : 049 031 031 degrees

   QTc Int : 504 ms

 

NORMAL SINUS RHYTHM

POSSIBLE LEFT ATRIAL ENLARGEMENT

INCOMPLETE RIGHT BUNDLE BRANCH BLOCK

PROLONGED QT

ABNORMAL ECG

WHEN COMPARED WITH ECG OF 24-JUL-2020 14:26,

NO SIGNIFICANT CHANGE WAS FOUND

Confirmed by SHANNON GIRARD, JR ([id number]) on 7/30/2020 11:31:56 AM

 

Referred By: JUDAH ALBARRAN           Confirmed By:JR ORTEGA MD

## 2020-07-30 NOTE — PN
BHS Progress Note


Note: 


patient admitted to rehab, [facility]. Stable, no complaints or urgent medical 

problems at this time. Labs, problem list, admission note reviewed.


                                   Vital Signs











 Period  Temp  Pulse  Resp  BP Sys/Marie  Pulse Ox


 


 Last 24 Hr  97.5 F-97.9 F  69-87  18-18  122-144/73-93  95-98

## 2020-07-31 RX ADMIN — ACETAMINOPHEN PRN MG: 325 TABLET ORAL at 10:35

## 2020-07-31 RX ADMIN — NICOTINE SCH: 7 PATCH TRANSDERMAL at 10:38

## 2020-07-31 RX ADMIN — PANTOPRAZOLE SODIUM SCH MG: 40 TABLET, DELAYED RELEASE ORAL at 10:34

## 2020-07-31 RX ADMIN — TRAZODONE HYDROCHLORIDE SCH MG: 50 TABLET ORAL at 21:04

## 2020-07-31 RX ADMIN — Medication SCH MG: at 21:04

## 2020-07-31 RX ADMIN — Medication SCH TAB: at 10:34

## 2020-07-31 NOTE — PN
BHS Progress Note


Note: 





                                Laboratory Tests











  07/31/20





  10:49


 


POC Glucometer  93








                                   Vital Signs











Temperature  98.0 F   07/31/20 06:52


 


Pulse Rate  97 H  07/31/20 06:52


 


Respiratory Rate  18   07/31/20 06:52


 


Blood Pressure  118/84   07/31/20 06:52


 


O2 Sat by Pulse Oximetry (%)  96   07/31/20 13:44








Patient's lab results reviewed and given to patient. Lipid profile ordered due 

to hx of HTN.

## 2020-08-01 RX ADMIN — PANTOPRAZOLE SODIUM SCH MG: 40 TABLET, DELAYED RELEASE ORAL at 09:40

## 2020-08-01 RX ADMIN — ALUMINUM HYDROXIDE, MAGNESIUM HYDROXIDE, AND SIMETHICONE PRN ML: 200; 200; 20 SUSPENSION ORAL at 13:02

## 2020-08-01 RX ADMIN — Medication SCH TAB: at 09:40

## 2020-08-01 RX ADMIN — TRAZODONE HYDROCHLORIDE SCH MG: 50 TABLET ORAL at 21:07

## 2020-08-01 RX ADMIN — Medication SCH MG: at 21:07

## 2020-08-01 RX ADMIN — NICOTINE SCH: 7 PATCH TRANSDERMAL at 09:40

## 2020-08-02 RX ADMIN — ACETAMINOPHEN PRN MG: 325 TABLET ORAL at 09:50

## 2020-08-02 RX ADMIN — Medication SCH MG: at 21:22

## 2020-08-02 RX ADMIN — NICOTINE SCH: 7 PATCH TRANSDERMAL at 09:49

## 2020-08-02 RX ADMIN — Medication SCH TAB: at 09:49

## 2020-08-02 RX ADMIN — TRAZODONE HYDROCHLORIDE SCH MG: 50 TABLET ORAL at 21:22

## 2020-08-02 RX ADMIN — PANTOPRAZOLE SODIUM SCH MG: 40 TABLET, DELAYED RELEASE ORAL at 09:49

## 2020-08-03 RX ADMIN — Medication SCH MG: at 21:10

## 2020-08-03 RX ADMIN — PANTOPRAZOLE SODIUM SCH MG: 40 TABLET, DELAYED RELEASE ORAL at 09:45

## 2020-08-03 RX ADMIN — Medication SCH MG: at 21:09

## 2020-08-03 RX ADMIN — ALUMINUM HYDROXIDE, MAGNESIUM HYDROXIDE, AND SIMETHICONE PRN ML: 200; 200; 20 SUSPENSION ORAL at 13:57

## 2020-08-03 RX ADMIN — TRAZODONE HYDROCHLORIDE SCH MG: 50 TABLET ORAL at 21:09

## 2020-08-03 RX ADMIN — Medication SCH TAB: at 09:45

## 2020-08-03 RX ADMIN — NICOTINE SCH: 7 PATCH TRANSDERMAL at 09:46

## 2020-08-04 RX ADMIN — Medication SCH MG: at 21:24

## 2020-08-04 RX ADMIN — TRAZODONE HYDROCHLORIDE SCH MG: 50 TABLET ORAL at 21:24

## 2020-08-04 RX ADMIN — NICOTINE SCH: 7 PATCH TRANSDERMAL at 09:54

## 2020-08-04 RX ADMIN — PANTOPRAZOLE SODIUM SCH MG: 40 TABLET, DELAYED RELEASE ORAL at 09:53

## 2020-08-04 RX ADMIN — Medication SCH TAB: at 09:53

## 2020-08-04 RX ADMIN — ALUMINUM HYDROXIDE, MAGNESIUM HYDROXIDE, AND SIMETHICONE PRN ML: 200; 200; 20 SUSPENSION ORAL at 09:53

## 2020-08-05 LAB
CHOLEST SERPL-MCNC: 246 MG/DL (ref 50–200)
HDLC SERPL-MCNC: 101 MG/DL (ref 40–60)
LDLC SERPL CALC-MCNC: 114 MG/DL (ref 5–100)
TRIGL SERPL-MCNC: 76 MG/DL (ref 0–150)

## 2020-08-05 RX ADMIN — TRAZODONE HYDROCHLORIDE SCH MG: 50 TABLET ORAL at 21:28

## 2020-08-05 RX ADMIN — GABAPENTIN SCH MG: 300 CAPSULE ORAL at 21:28

## 2020-08-05 RX ADMIN — PANTOPRAZOLE SODIUM SCH MG: 40 TABLET, DELAYED RELEASE ORAL at 09:57

## 2020-08-05 RX ADMIN — NICOTINE SCH: 7 PATCH TRANSDERMAL at 09:56

## 2020-08-05 RX ADMIN — Medication SCH TAB: at 09:57

## 2020-08-05 RX ADMIN — Medication SCH MG: at 21:28

## 2020-08-05 RX ADMIN — GABAPENTIN SCH MG: 300 CAPSULE ORAL at 13:15

## 2020-08-05 RX ADMIN — METOPROLOL TARTRATE SCH MG: 25 TABLET, FILM COATED ORAL at 21:28

## 2020-08-06 RX ADMIN — FAMOTIDINE SCH MG: 20 TABLET ORAL at 09:59

## 2020-08-06 RX ADMIN — Medication SCH TAB: at 09:57

## 2020-08-06 RX ADMIN — TRAZODONE HYDROCHLORIDE SCH MG: 50 TABLET ORAL at 21:18

## 2020-08-06 RX ADMIN — PANTOPRAZOLE SODIUM SCH MG: 40 TABLET, DELAYED RELEASE ORAL at 09:57

## 2020-08-06 RX ADMIN — GABAPENTIN SCH MG: 300 CAPSULE ORAL at 21:18

## 2020-08-06 RX ADMIN — CYANOCOBALAMIN TAB 1000 MCG SCH MCG: 1000 TAB at 09:58

## 2020-08-06 RX ADMIN — GABAPENTIN SCH MG: 300 CAPSULE ORAL at 13:57

## 2020-08-06 RX ADMIN — Medication SCH MG: at 21:18

## 2020-08-06 RX ADMIN — Medication SCH TAB: at 09:59

## 2020-08-06 RX ADMIN — NICOTINE SCH: 7 PATCH TRANSDERMAL at 10:00

## 2020-08-06 RX ADMIN — Medication SCH MG: at 09:57

## 2020-08-06 RX ADMIN — METOPROLOL TARTRATE SCH MG: 25 TABLET, FILM COATED ORAL at 09:59

## 2020-08-06 RX ADMIN — METOPROLOL TARTRATE SCH MG: 25 TABLET, FILM COATED ORAL at 21:18

## 2020-08-06 RX ADMIN — GABAPENTIN SCH MG: 300 CAPSULE ORAL at 06:54

## 2020-08-07 RX ADMIN — GABAPENTIN SCH MG: 300 CAPSULE ORAL at 06:41

## 2020-08-07 RX ADMIN — Medication SCH TAB: at 10:05

## 2020-08-07 RX ADMIN — Medication SCH MG: at 21:34

## 2020-08-07 RX ADMIN — ATORVASTATIN CALCIUM SCH MG: 10 TABLET, FILM COATED ORAL at 21:34

## 2020-08-07 RX ADMIN — GABAPENTIN SCH MG: 300 CAPSULE ORAL at 14:40

## 2020-08-07 RX ADMIN — METOPROLOL TARTRATE SCH MG: 25 TABLET, FILM COATED ORAL at 10:04

## 2020-08-07 RX ADMIN — Medication SCH MG: at 10:04

## 2020-08-07 RX ADMIN — FAMOTIDINE SCH MG: 20 TABLET ORAL at 10:07

## 2020-08-07 RX ADMIN — CYANOCOBALAMIN TAB 1000 MCG SCH MCG: 1000 TAB at 10:04

## 2020-08-07 RX ADMIN — PANTOPRAZOLE SODIUM SCH MG: 40 TABLET, DELAYED RELEASE ORAL at 10:04

## 2020-08-07 RX ADMIN — TRAZODONE HYDROCHLORIDE SCH MG: 50 TABLET ORAL at 21:33

## 2020-08-07 RX ADMIN — NIFEDIPINE SCH MG: 30 TABLET, EXTENDED RELEASE ORAL at 10:58

## 2020-08-07 RX ADMIN — GABAPENTIN SCH MG: 300 CAPSULE ORAL at 21:33

## 2020-08-07 RX ADMIN — METOPROLOL TARTRATE SCH MG: 25 TABLET, FILM COATED ORAL at 21:33

## 2020-08-07 RX ADMIN — NICOTINE SCH: 7 PATCH TRANSDERMAL at 10:07

## 2020-08-07 RX ADMIN — Medication SCH MG: at 21:33

## 2020-08-07 NOTE — PN
BHS Progress Note


Note: 


PATIENT SEEN FOR F/U LABS. 








                                Laboratory Tests











  07/31/20 08/05/20





  10:49 08:00


 


POC Glucometer  93 


 


Triglycerides   76


 


Cholesterol   246 H


 


Total LDL Cholesterol   114 H


 


HDL Cholesterol   101 H








                                   Vital Signs











Temperature  97.6 F   08/07/20 06:37


 


Pulse Rate  85   08/07/20 08:44


 


Respiratory Rate  18   08/07/20 08:44


 


Blood Pressure  148/85   08/07/20 08:44


 


O2 Sat by Pulse Oximetry (%)  98   08/07/20 06:37








ROS: DENIES CHEST PAIN, COUGH, SOB AND DIZZINESS





PE





ALERT AND ORIENTED X 3


SKIN WARM AND DRY


NECK SUPPLE, NO JVD


EXT FULL ROM, AMB AD TAHIR


NO TREMORS








A/P:





HLD





LABS APPRECIATED


WILL START ATORVASTATIN 10MG HS


PATIENT EDUCATED ON LOW CHOL/FAT DIET AND MEDICALLY ADVISED TO FOLLOW UP WITH 

PCP TO CONTINUE MANAGEMENT OF CHOLESTEROL AND HTN ONCE DISCHARGE. PATIENT 

VERBALIZED UNDERSTANDING OF ALL INFORMATION PROVIDED.

## 2020-08-08 RX ADMIN — Medication SCH MG: at 10:15

## 2020-08-08 RX ADMIN — METOPROLOL TARTRATE SCH MG: 25 TABLET, FILM COATED ORAL at 21:21

## 2020-08-08 RX ADMIN — Medication SCH MG: at 21:21

## 2020-08-08 RX ADMIN — NICOTINE SCH: 7 PATCH TRANSDERMAL at 10:14

## 2020-08-08 RX ADMIN — NIFEDIPINE SCH MG: 30 TABLET, EXTENDED RELEASE ORAL at 10:14

## 2020-08-08 RX ADMIN — PANTOPRAZOLE SODIUM SCH MG: 40 TABLET, DELAYED RELEASE ORAL at 10:14

## 2020-08-08 RX ADMIN — Medication SCH TAB: at 10:14

## 2020-08-08 RX ADMIN — GABAPENTIN SCH MG: 300 CAPSULE ORAL at 21:21

## 2020-08-08 RX ADMIN — ATORVASTATIN CALCIUM SCH MG: 10 TABLET, FILM COATED ORAL at 21:21

## 2020-08-08 RX ADMIN — CYANOCOBALAMIN TAB 1000 MCG SCH MCG: 1000 TAB at 10:15

## 2020-08-08 RX ADMIN — METOPROLOL TARTRATE SCH MG: 25 TABLET, FILM COATED ORAL at 10:14

## 2020-08-08 RX ADMIN — TRAZODONE HYDROCHLORIDE SCH MG: 50 TABLET ORAL at 21:21

## 2020-08-08 RX ADMIN — GABAPENTIN SCH: 300 CAPSULE ORAL at 07:32

## 2020-08-08 RX ADMIN — GABAPENTIN SCH MG: 300 CAPSULE ORAL at 13:12

## 2020-08-09 RX ADMIN — Medication SCH TAB: at 09:43

## 2020-08-09 RX ADMIN — GABAPENTIN SCH MG: 300 CAPSULE ORAL at 06:39

## 2020-08-09 RX ADMIN — CYANOCOBALAMIN TAB 1000 MCG SCH MCG: 1000 TAB at 09:43

## 2020-08-09 RX ADMIN — GABAPENTIN SCH MG: 300 CAPSULE ORAL at 21:22

## 2020-08-09 RX ADMIN — NICOTINE SCH: 7 PATCH TRANSDERMAL at 09:44

## 2020-08-09 RX ADMIN — Medication SCH MG: at 09:43

## 2020-08-09 RX ADMIN — GABAPENTIN SCH MG: 300 CAPSULE ORAL at 13:08

## 2020-08-09 RX ADMIN — NIFEDIPINE SCH MG: 30 TABLET, EXTENDED RELEASE ORAL at 09:43

## 2020-08-09 RX ADMIN — PANTOPRAZOLE SODIUM SCH MG: 40 TABLET, DELAYED RELEASE ORAL at 09:44

## 2020-08-09 RX ADMIN — METOPROLOL TARTRATE SCH MG: 25 TABLET, FILM COATED ORAL at 09:43

## 2020-08-09 RX ADMIN — Medication SCH MG: at 21:22

## 2020-08-09 RX ADMIN — TRAZODONE HYDROCHLORIDE SCH MG: 50 TABLET ORAL at 21:22

## 2020-08-09 RX ADMIN — ATORVASTATIN CALCIUM SCH MG: 10 TABLET, FILM COATED ORAL at 21:22

## 2020-08-09 RX ADMIN — METOPROLOL TARTRATE SCH MG: 25 TABLET, FILM COATED ORAL at 21:22

## 2020-08-10 RX ADMIN — ATORVASTATIN CALCIUM SCH MG: 10 TABLET, FILM COATED ORAL at 21:16

## 2020-08-10 RX ADMIN — TRAZODONE HYDROCHLORIDE SCH MG: 50 TABLET ORAL at 21:16

## 2020-08-10 RX ADMIN — PANTOPRAZOLE SODIUM SCH MG: 40 TABLET, DELAYED RELEASE ORAL at 10:33

## 2020-08-10 RX ADMIN — GABAPENTIN SCH MG: 300 CAPSULE ORAL at 14:01

## 2020-08-10 RX ADMIN — Medication SCH TAB: at 10:32

## 2020-08-10 RX ADMIN — GABAPENTIN SCH MG: 300 CAPSULE ORAL at 06:24

## 2020-08-10 RX ADMIN — Medication SCH MG: at 21:16

## 2020-08-10 RX ADMIN — NIFEDIPINE SCH MG: 30 TABLET, EXTENDED RELEASE ORAL at 10:33

## 2020-08-10 RX ADMIN — CYANOCOBALAMIN TAB 1000 MCG SCH MCG: 1000 TAB at 10:33

## 2020-08-10 RX ADMIN — METOPROLOL TARTRATE SCH MG: 25 TABLET, FILM COATED ORAL at 10:31

## 2020-08-10 RX ADMIN — Medication SCH MG: at 10:31

## 2020-08-10 RX ADMIN — NICOTINE SCH: 7 PATCH TRANSDERMAL at 10:35

## 2020-08-10 RX ADMIN — METOPROLOL TARTRATE SCH MG: 25 TABLET, FILM COATED ORAL at 21:16

## 2020-08-10 RX ADMIN — Medication SCH TAB: at 10:31

## 2020-08-10 RX ADMIN — GABAPENTIN SCH MG: 300 CAPSULE ORAL at 21:16

## 2020-08-11 RX ADMIN — Medication SCH MG: at 10:16

## 2020-08-11 RX ADMIN — METOPROLOL TARTRATE SCH MG: 25 TABLET, FILM COATED ORAL at 21:22

## 2020-08-11 RX ADMIN — Medication SCH MG: at 21:22

## 2020-08-11 RX ADMIN — GABAPENTIN SCH MG: 300 CAPSULE ORAL at 21:22

## 2020-08-11 RX ADMIN — GABAPENTIN SCH MG: 300 CAPSULE ORAL at 06:30

## 2020-08-11 RX ADMIN — NICOTINE SCH: 7 PATCH TRANSDERMAL at 10:16

## 2020-08-11 RX ADMIN — GABAPENTIN SCH MG: 300 CAPSULE ORAL at 14:58

## 2020-08-11 RX ADMIN — Medication SCH TAB: at 10:15

## 2020-08-11 RX ADMIN — CYANOCOBALAMIN TAB 1000 MCG SCH MCG: 1000 TAB at 10:16

## 2020-08-11 RX ADMIN — ATORVASTATIN CALCIUM SCH MG: 10 TABLET, FILM COATED ORAL at 21:22

## 2020-08-11 RX ADMIN — PANTOPRAZOLE SODIUM SCH MG: 40 TABLET, DELAYED RELEASE ORAL at 10:15

## 2020-08-11 RX ADMIN — NIFEDIPINE SCH MG: 30 TABLET, EXTENDED RELEASE ORAL at 10:16

## 2020-08-11 RX ADMIN — TRAZODONE HYDROCHLORIDE SCH MG: 50 TABLET ORAL at 21:22

## 2020-08-11 RX ADMIN — METOPROLOL TARTRATE SCH MG: 25 TABLET, FILM COATED ORAL at 10:15

## 2020-08-11 NOTE — PN
BHS Progress Note


Note: 


Wants to re-start Vivitrol. Pt had been on Vivitrol at [facility], but 

stopped with COVID-19 epidemic. Wants to re-start. Discussed with patient. She 

does not know her exact discharge date and is working towards have 28 days. I 

need to schedule the Vivitrol so that the patient has sufficient time upon 

discharge to schedule her next monthly dose. Will discuss with counselor. 


                                   Vital Signs











 Period  Temp  Pulse  Resp  BP Sys/Marie  Pulse Ox


 


 Last 24 Hr  96.9 F-97.1 F  63-76  18  131-146/90-99  95-97

## 2020-08-12 RX ADMIN — CYANOCOBALAMIN TAB 1000 MCG SCH MCG: 1000 TAB at 10:01

## 2020-08-12 RX ADMIN — METOPROLOL TARTRATE SCH MG: 25 TABLET, FILM COATED ORAL at 21:22

## 2020-08-12 RX ADMIN — Medication SCH MG: at 10:01

## 2020-08-12 RX ADMIN — Medication SCH TAB: at 10:01

## 2020-08-12 RX ADMIN — Medication SCH MG: at 21:22

## 2020-08-12 RX ADMIN — NICOTINE SCH: 7 PATCH TRANSDERMAL at 10:01

## 2020-08-12 RX ADMIN — METOPROLOL TARTRATE SCH MG: 25 TABLET, FILM COATED ORAL at 10:01

## 2020-08-12 RX ADMIN — NIFEDIPINE SCH MG: 30 TABLET, EXTENDED RELEASE ORAL at 10:01

## 2020-08-12 RX ADMIN — TRAZODONE HYDROCHLORIDE SCH MG: 50 TABLET ORAL at 21:22

## 2020-08-12 RX ADMIN — GABAPENTIN SCH MG: 300 CAPSULE ORAL at 14:10

## 2020-08-12 RX ADMIN — GABAPENTIN SCH MG: 300 CAPSULE ORAL at 06:34

## 2020-08-12 RX ADMIN — PANTOPRAZOLE SODIUM SCH MG: 40 TABLET, DELAYED RELEASE ORAL at 10:01

## 2020-08-12 RX ADMIN — GABAPENTIN SCH MG: 300 CAPSULE ORAL at 21:22

## 2020-08-12 RX ADMIN — ATORVASTATIN CALCIUM SCH MG: 10 TABLET, FILM COATED ORAL at 21:22

## 2020-08-13 RX ADMIN — NALTREXONE HYDROCHLORIDE SCH MG: 50 TABLET, FILM COATED ORAL at 10:15

## 2020-08-13 RX ADMIN — NICOTINE SCH: 7 PATCH TRANSDERMAL at 10:15

## 2020-08-13 RX ADMIN — NIFEDIPINE SCH MG: 30 TABLET, EXTENDED RELEASE ORAL at 10:16

## 2020-08-13 RX ADMIN — GABAPENTIN SCH MG: 300 CAPSULE ORAL at 14:40

## 2020-08-13 RX ADMIN — TRAZODONE HYDROCHLORIDE SCH MG: 50 TABLET ORAL at 21:18

## 2020-08-13 RX ADMIN — GABAPENTIN SCH MG: 300 CAPSULE ORAL at 06:18

## 2020-08-13 RX ADMIN — CYANOCOBALAMIN TAB 1000 MCG SCH MCG: 1000 TAB at 10:15

## 2020-08-13 RX ADMIN — PANTOPRAZOLE SODIUM SCH MG: 40 TABLET, DELAYED RELEASE ORAL at 10:15

## 2020-08-13 RX ADMIN — METOPROLOL TARTRATE SCH MG: 25 TABLET, FILM COATED ORAL at 10:15

## 2020-08-13 RX ADMIN — Medication SCH TAB: at 10:15

## 2020-08-13 RX ADMIN — Medication SCH MG: at 21:18

## 2020-08-13 RX ADMIN — Medication SCH MG: at 10:16

## 2020-08-13 RX ADMIN — METOPROLOL TARTRATE SCH MG: 25 TABLET, FILM COATED ORAL at 21:18

## 2020-08-13 RX ADMIN — ATORVASTATIN CALCIUM SCH MG: 10 TABLET, FILM COATED ORAL at 21:18

## 2020-08-13 RX ADMIN — GABAPENTIN SCH MG: 300 CAPSULE ORAL at 21:18

## 2020-08-14 RX ADMIN — Medication SCH MG: at 21:40

## 2020-08-14 RX ADMIN — GABAPENTIN SCH MG: 300 CAPSULE ORAL at 13:56

## 2020-08-14 RX ADMIN — NIFEDIPINE SCH MG: 30 TABLET, EXTENDED RELEASE ORAL at 09:59

## 2020-08-14 RX ADMIN — GABAPENTIN SCH MG: 300 CAPSULE ORAL at 21:40

## 2020-08-14 RX ADMIN — NALTREXONE HYDROCHLORIDE SCH MG: 50 TABLET, FILM COATED ORAL at 10:00

## 2020-08-14 RX ADMIN — Medication SCH TAB: at 10:00

## 2020-08-14 RX ADMIN — TRAZODONE HYDROCHLORIDE SCH MG: 50 TABLET ORAL at 21:40

## 2020-08-14 RX ADMIN — CYANOCOBALAMIN TAB 1000 MCG SCH MCG: 1000 TAB at 10:00

## 2020-08-14 RX ADMIN — ACETAMINOPHEN PRN MG: 325 TABLET ORAL at 13:56

## 2020-08-14 RX ADMIN — PANTOPRAZOLE SODIUM SCH MG: 40 TABLET, DELAYED RELEASE ORAL at 10:00

## 2020-08-14 RX ADMIN — GABAPENTIN SCH MG: 300 CAPSULE ORAL at 06:08

## 2020-08-14 RX ADMIN — Medication SCH TAB: at 09:59

## 2020-08-14 RX ADMIN — METOPROLOL TARTRATE SCH MG: 25 TABLET, FILM COATED ORAL at 10:00

## 2020-08-14 RX ADMIN — ATORVASTATIN CALCIUM SCH MG: 10 TABLET, FILM COATED ORAL at 21:40

## 2020-08-14 RX ADMIN — NICOTINE SCH: 7 PATCH TRANSDERMAL at 10:02

## 2020-08-14 RX ADMIN — Medication SCH MG: at 10:00

## 2020-08-14 RX ADMIN — METOPROLOL TARTRATE SCH MG: 25 TABLET, FILM COATED ORAL at 21:40

## 2020-08-15 RX ADMIN — Medication SCH TAB: at 09:49

## 2020-08-15 RX ADMIN — TRAZODONE HYDROCHLORIDE SCH MG: 50 TABLET ORAL at 21:35

## 2020-08-15 RX ADMIN — Medication SCH MG: at 09:50

## 2020-08-15 RX ADMIN — NALTREXONE HYDROCHLORIDE SCH MG: 50 TABLET, FILM COATED ORAL at 09:50

## 2020-08-15 RX ADMIN — METOPROLOL TARTRATE SCH MG: 25 TABLET, FILM COATED ORAL at 09:49

## 2020-08-15 RX ADMIN — METOPROLOL TARTRATE SCH MG: 25 TABLET, FILM COATED ORAL at 21:34

## 2020-08-15 RX ADMIN — GABAPENTIN SCH MG: 300 CAPSULE ORAL at 14:54

## 2020-08-15 RX ADMIN — Medication SCH MG: at 21:34

## 2020-08-15 RX ADMIN — CYANOCOBALAMIN TAB 1000 MCG SCH MCG: 1000 TAB at 09:49

## 2020-08-15 RX ADMIN — PANTOPRAZOLE SODIUM SCH MG: 40 TABLET, DELAYED RELEASE ORAL at 09:49

## 2020-08-15 RX ADMIN — GABAPENTIN SCH MG: 300 CAPSULE ORAL at 06:24

## 2020-08-15 RX ADMIN — ATORVASTATIN CALCIUM SCH MG: 10 TABLET, FILM COATED ORAL at 21:35

## 2020-08-15 RX ADMIN — ACETAMINOPHEN PRN MG: 325 TABLET ORAL at 06:25

## 2020-08-15 RX ADMIN — NICOTINE SCH: 7 PATCH TRANSDERMAL at 09:50

## 2020-08-15 RX ADMIN — GABAPENTIN SCH MG: 300 CAPSULE ORAL at 21:34

## 2020-08-15 RX ADMIN — NIFEDIPINE SCH MG: 30 TABLET, EXTENDED RELEASE ORAL at 09:50

## 2020-08-16 RX ADMIN — PANTOPRAZOLE SODIUM SCH MG: 40 TABLET, DELAYED RELEASE ORAL at 10:12

## 2020-08-16 RX ADMIN — METOPROLOL TARTRATE SCH MG: 25 TABLET, FILM COATED ORAL at 21:29

## 2020-08-16 RX ADMIN — CYANOCOBALAMIN TAB 1000 MCG SCH MCG: 1000 TAB at 10:13

## 2020-08-16 RX ADMIN — Medication SCH MG: at 10:12

## 2020-08-16 RX ADMIN — Medication SCH MG: at 21:29

## 2020-08-16 RX ADMIN — NICOTINE SCH: 7 PATCH TRANSDERMAL at 10:12

## 2020-08-16 RX ADMIN — METOPROLOL TARTRATE SCH MG: 25 TABLET, FILM COATED ORAL at 10:12

## 2020-08-16 RX ADMIN — Medication SCH TAB: at 10:12

## 2020-08-16 RX ADMIN — TRAZODONE HYDROCHLORIDE SCH MG: 50 TABLET ORAL at 21:29

## 2020-08-16 RX ADMIN — GABAPENTIN SCH MG: 300 CAPSULE ORAL at 14:38

## 2020-08-16 RX ADMIN — ATORVASTATIN CALCIUM SCH MG: 10 TABLET, FILM COATED ORAL at 21:29

## 2020-08-16 RX ADMIN — GABAPENTIN SCH MG: 300 CAPSULE ORAL at 06:21

## 2020-08-16 RX ADMIN — GABAPENTIN SCH MG: 300 CAPSULE ORAL at 21:29

## 2020-08-16 RX ADMIN — NIFEDIPINE SCH MG: 30 TABLET, EXTENDED RELEASE ORAL at 10:12

## 2020-08-16 RX ADMIN — NALTREXONE HYDROCHLORIDE SCH MG: 50 TABLET, FILM COATED ORAL at 10:12

## 2020-08-17 RX ADMIN — ATORVASTATIN CALCIUM SCH MG: 10 TABLET, FILM COATED ORAL at 21:52

## 2020-08-17 RX ADMIN — CYANOCOBALAMIN TAB 1000 MCG SCH MCG: 1000 TAB at 10:49

## 2020-08-17 RX ADMIN — METOPROLOL TARTRATE SCH MG: 25 TABLET, FILM COATED ORAL at 10:49

## 2020-08-17 RX ADMIN — Medication SCH TAB: at 10:49

## 2020-08-17 RX ADMIN — METOPROLOL TARTRATE SCH MG: 25 TABLET, FILM COATED ORAL at 21:52

## 2020-08-17 RX ADMIN — Medication SCH MG: at 21:52

## 2020-08-17 RX ADMIN — ACETAMINOPHEN PRN MG: 325 TABLET ORAL at 06:40

## 2020-08-17 RX ADMIN — NALTREXONE HYDROCHLORIDE SCH MG: 50 TABLET, FILM COATED ORAL at 10:48

## 2020-08-17 RX ADMIN — GABAPENTIN SCH MG: 300 CAPSULE ORAL at 06:40

## 2020-08-17 RX ADMIN — Medication SCH TAB: at 10:48

## 2020-08-17 RX ADMIN — TRAZODONE HYDROCHLORIDE SCH MG: 50 TABLET ORAL at 21:52

## 2020-08-17 RX ADMIN — GABAPENTIN SCH MG: 300 CAPSULE ORAL at 21:52

## 2020-08-17 RX ADMIN — Medication SCH MG: at 21:53

## 2020-08-17 RX ADMIN — GABAPENTIN SCH MG: 300 CAPSULE ORAL at 14:31

## 2020-08-17 RX ADMIN — Medication SCH MG: at 10:49

## 2020-08-17 RX ADMIN — NIFEDIPINE SCH MG: 30 TABLET, EXTENDED RELEASE ORAL at 10:49

## 2020-08-17 RX ADMIN — PANTOPRAZOLE SODIUM SCH MG: 40 TABLET, DELAYED RELEASE ORAL at 10:49

## 2020-08-17 RX ADMIN — NICOTINE SCH: 7 PATCH TRANSDERMAL at 10:49

## 2020-08-18 RX ADMIN — METOPROLOL TARTRATE SCH MG: 25 TABLET, FILM COATED ORAL at 21:31

## 2020-08-18 RX ADMIN — GABAPENTIN SCH MG: 300 CAPSULE ORAL at 13:55

## 2020-08-18 RX ADMIN — METOPROLOL TARTRATE SCH MG: 25 TABLET, FILM COATED ORAL at 10:00

## 2020-08-18 RX ADMIN — GABAPENTIN SCH MG: 300 CAPSULE ORAL at 06:37

## 2020-08-18 RX ADMIN — ACETAMINOPHEN PRN MG: 325 TABLET ORAL at 13:55

## 2020-08-18 RX ADMIN — NICOTINE SCH: 7 PATCH TRANSDERMAL at 10:00

## 2020-08-18 RX ADMIN — ATORVASTATIN CALCIUM SCH MG: 10 TABLET, FILM COATED ORAL at 21:31

## 2020-08-18 RX ADMIN — Medication SCH MG: at 21:31

## 2020-08-18 RX ADMIN — TRAZODONE HYDROCHLORIDE SCH MG: 50 TABLET ORAL at 21:31

## 2020-08-18 RX ADMIN — Medication SCH MG: at 10:00

## 2020-08-18 RX ADMIN — PANTOPRAZOLE SODIUM SCH MG: 40 TABLET, DELAYED RELEASE ORAL at 10:00

## 2020-08-18 RX ADMIN — NIFEDIPINE SCH MG: 30 TABLET, EXTENDED RELEASE ORAL at 10:00

## 2020-08-18 RX ADMIN — CYANOCOBALAMIN TAB 1000 MCG SCH MCG: 1000 TAB at 10:01

## 2020-08-18 RX ADMIN — Medication SCH TAB: at 10:00

## 2020-08-18 RX ADMIN — GABAPENTIN SCH MG: 300 CAPSULE ORAL at 21:31

## 2020-08-18 NOTE — PN
BHS Progress Note


Note: 





Patient is scheduled for discharge tomorrow. Scripts for 30 days supply of 

medications(Prozac 40 mg/day, Trazadone 50 mg/hs)will be electronically 

transmitted to [facility], [address] [place] [id number]

## 2020-08-19 VITALS — TEMPERATURE: 98.4 F

## 2020-08-19 VITALS — DIASTOLIC BLOOD PRESSURE: 86 MMHG | SYSTOLIC BLOOD PRESSURE: 142 MMHG | HEART RATE: 71 BPM

## 2020-08-19 RX ADMIN — Medication SCH TAB: at 09:02

## 2020-08-19 RX ADMIN — NICOTINE SCH: 7 PATCH TRANSDERMAL at 09:03

## 2020-08-19 RX ADMIN — Medication SCH MG: at 09:02

## 2020-08-19 RX ADMIN — PANTOPRAZOLE SODIUM SCH MG: 40 TABLET, DELAYED RELEASE ORAL at 09:02

## 2020-08-19 RX ADMIN — NIFEDIPINE SCH MG: 30 TABLET, EXTENDED RELEASE ORAL at 09:02

## 2020-08-19 RX ADMIN — GABAPENTIN SCH MG: 300 CAPSULE ORAL at 06:43

## 2020-08-19 RX ADMIN — CYANOCOBALAMIN TAB 1000 MCG SCH MCG: 1000 TAB at 09:02

## 2020-08-19 RX ADMIN — METOPROLOL TARTRATE SCH MG: 25 TABLET, FILM COATED ORAL at 09:03

## 2020-08-19 RX ADMIN — Medication SCH TAB: at 09:03

## 2020-08-19 NOTE — DS
[facility] Rehab Discharge Summary





- [facility] Rehab Discharge Summary


Admission Date: 07/29/20


Discharge Date: 08/19/20





- History


Present History: Alcohol dependence


Pertinent Past History: 


Patient is a 48 y/o with  alcohol use disorder. Patient endorses she consumes 8 

glasses of vodka/whiskey daily. Last drink was 5:30 am today. Age of first use 

16. Patient states she does require an eye opener and that she has blacked out 

multiple times in the past from drinking. 





PMH: Depression (Fluoxetine, Trazadone), HTN, Hearing loss (acoustic 

schwannoma), osteoarthritis b/l knees


Social Hx- Unemployed. Remote history of crack use- May of this year. 


SurgHx- Gastric Sleeve ([facility])


FH- Father Alcoholism, Leukemia. Mother Healthy. 








- Discharge Physical Exam


Vital Signs: 


                                   Vital Signs











Temperature  98.4 F   08/19/20 06:13


 


Pulse Rate  63   08/19/20 06:13


 


Respiratory Rate  18   08/19/20 06:13


 


Blood Pressure  139/91   08/19/20 06:13


 


O2 Sat by Pulse Oximetry (%)  96   08/19/20 06:13











Pertinent Admission Physical Exam Findings: 


Physical


General Appearance: No apparent distress


HEENTM:  EOMI, Normocephalic,


Respiratory:  No Respiratory Distress


Abdominal: Obese +BS


Musculoskeletal:  gait steady with cane


Neurological: CNs II-XII NML intact, 








- Treatment


Hospital Course: 


patient attended groups, had 1:1 with her counselor, was seen by the psychiatric

service. She was adherent to her medication regimen and treatment plan. She had 

no acute medical problems while in rehab. She was started on Vivitrol while in 

rehab and tolerated the induction well. 








- Medication


Discharge Medications: 


Ambulatory Orders





Calcium Carbonate/Vitamin D3 [Calcium 500-Vit D3 400 Tablet] 1 tab PO DAILY 

07/24/20 


Cyanocobalamin [Vitamin B12 -] 500 mcg PO DAILY 07/24/20 


Gabapentin 300 mg PO TID 07/24/20 


Iron Polysaccharide Complex [Ferric X-150] 150 mg PO DAILY 07/24/20 


Metoprolol Tartrate 25 mg PO BID 07/24/20 


Multivitamins [Multivit ([facility] Formulary)] 1 tab PO DAILY 07/24/20 


Atorvastatin Ca [Lipitor] 10 mg PO HS #30 tablet MDD 1 08/18/20 


Omeprazole 20 mg PO DAILY #30 mg MDD 1 08/18/20 


Fluoxetine HCl [Prozac] 40 mg PO DAILY #30 cap 08/19/20 


traZODone HCL [Trazodone HCl] 50 mg PO HS #30 tablet 08/19/20 











- Medication-Assisted Treatment (MAT)


Medication-Assisted Treatment (MAT): Yes


Medication Prescribed: Vivitrol (inj)


MAT Follow-up Referral: 


Patient will go to [facility] to continue her Vivitrol Treatment. 








- Discharge Instructions


Diet, activity, other medical instructions: 





Diet:





Activity: 





Other medical instructions:








- Diagnosis


(1) Alcohol dependence


Current Visit: Yes   Status: Chronic   





- Follow-up Referral


Minutes to complete discharge: 15





- AMA


Did Patient Leave Against Medical Advice: No

## 2022-06-21 ENCOUNTER — HOSPITAL ENCOUNTER (INPATIENT)
Dept: HOSPITAL 74 - YASAS | Age: 52
LOS: 4 days | Discharge: HOME | DRG: 897 | End: 2022-06-25
Attending: SURGERY | Admitting: ALLERGY & IMMUNOLOGY
Payer: COMMERCIAL

## 2022-06-21 VITALS — BODY MASS INDEX: 45.1 KG/M2

## 2022-06-21 DIAGNOSIS — Z87.891: ICD-10-CM

## 2022-06-21 DIAGNOSIS — F10.230: Primary | ICD-10-CM

## 2022-06-21 DIAGNOSIS — I48.91: ICD-10-CM

## 2022-06-21 DIAGNOSIS — F32.A: ICD-10-CM

## 2022-06-21 DIAGNOSIS — F41.9: ICD-10-CM

## 2022-06-21 DIAGNOSIS — F14.20: ICD-10-CM

## 2022-06-21 DIAGNOSIS — I10: ICD-10-CM

## 2022-06-21 DIAGNOSIS — M17.0: ICD-10-CM

## 2022-06-21 PROCEDURE — U0003 INFECTIOUS AGENT DETECTION BY NUCLEIC ACID (DNA OR RNA); SEVERE ACUTE RESPIRATORY SYNDROME CORONAVIRUS 2 (SARS-COV-2) (CORONAVIRUS DISEASE [COVID-19]), AMPLIFIED PROBE TECHNIQUE, MAKING USE OF HIGH THROUGHPUT TECHNOLOGIES AS DESCRIBED BY CMS-2020-01-R: HCPCS

## 2022-06-21 PROCEDURE — U0005 INFEC AGEN DETEC AMPLI PROBE: HCPCS

## 2022-06-21 PROCEDURE — HZ2ZZZZ DETOXIFICATION SERVICES FOR SUBSTANCE ABUSE TREATMENT: ICD-10-PCS | Performed by: SURGERY

## 2022-06-21 RX ADMIN — ACETAMINOPHEN PRN MG: 325 TABLET ORAL at 22:34

## 2022-06-21 RX ADMIN — HYDROXYZINE PAMOATE SCH MG: 25 CAPSULE ORAL at 22:33

## 2022-06-21 RX ADMIN — Medication SCH TAB: at 17:29

## 2022-06-21 RX ADMIN — Medication SCH MG: at 22:34

## 2022-06-21 RX ADMIN — Medication SCH MG: at 22:33

## 2022-06-21 RX ADMIN — HYDROXYZINE PAMOATE SCH MG: 25 CAPSULE ORAL at 17:29

## 2022-06-21 RX ADMIN — METOPROLOL TARTRATE SCH MG: 25 TABLET, FILM COATED ORAL at 22:33

## 2022-06-22 LAB
ALBUMIN SERPL-MCNC: 3.4 G/DL (ref 3.4–5)
ALP SERPL-CCNC: 93 U/L (ref 45–117)
ALT SERPL-CCNC: 19 U/L (ref 13–61)
ANION GAP SERPL CALC-SCNC: 7 MMOL/L (ref 8–16)
AST SERPL-CCNC: 13 U/L (ref 15–37)
BILIRUB SERPL-MCNC: 0.8 MG/DL (ref 0.2–1)
BUN SERPL-MCNC: 11.3 MG/DL (ref 7–18)
CALCIUM SERPL-MCNC: 9 MG/DL (ref 8.5–10.1)
CHLORIDE SERPL-SCNC: 102 MMOL/L (ref 98–107)
CO2 SERPL-SCNC: 34 MMOL/L (ref 21–32)
CREAT SERPL-MCNC: 0.6 MG/DL (ref 0.55–1.3)
DEPRECATED RDW RBC AUTO: 16.5 % (ref 11.6–15.6)
GLUCOSE SERPL-MCNC: 80 MG/DL (ref 74–106)
HCT VFR BLD CALC: 38.4 % (ref 32.4–45.2)
HGB BLD-MCNC: 12.3 GM/DL (ref 10.7–15.3)
HIV 1+2 AB+HIV1 P24 AG SERPL QL IA: NEGATIVE
MCH RBC QN AUTO: 27.5 PG (ref 25.7–33.7)
MCHC RBC AUTO-ENTMCNC: 32 G/DL (ref 32–36)
MCV RBC: 86 FL (ref 80–96)
PLATELET # BLD AUTO: 231 10^3/UL (ref 134–434)
PMV BLD: 11.1 FL (ref 7.5–11.1)
PROT SERPL-MCNC: 6.9 G/DL (ref 6.4–8.2)
RBC # BLD AUTO: 4.46 M/MM3 (ref 3.6–5.2)
SODIUM SERPL-SCNC: 142 MMOL/L (ref 136–145)
WBC # BLD AUTO: 6.1 K/MM3 (ref 4–10)

## 2022-06-22 RX ADMIN — ACETAMINOPHEN PRN MG: 325 TABLET ORAL at 14:41

## 2022-06-22 RX ADMIN — HYDROXYZINE PAMOATE SCH MG: 25 CAPSULE ORAL at 10:14

## 2022-06-22 RX ADMIN — HYDROXYZINE PAMOATE SCH MG: 25 CAPSULE ORAL at 05:57

## 2022-06-22 RX ADMIN — METHOCARBAMOL PRN MG: 500 TABLET ORAL at 10:13

## 2022-06-22 RX ADMIN — Medication SCH MG: at 22:31

## 2022-06-22 RX ADMIN — ACETAMINOPHEN PRN MG: 325 TABLET ORAL at 22:32

## 2022-06-22 RX ADMIN — IBUPROFEN PRN MG: 600 TABLET, FILM COATED ORAL at 17:48

## 2022-06-22 RX ADMIN — Medication SCH TAB: at 10:13

## 2022-06-22 RX ADMIN — GABAPENTIN SCH MG: 300 CAPSULE ORAL at 22:31

## 2022-06-22 RX ADMIN — HYDROXYZINE PAMOATE SCH MG: 25 CAPSULE ORAL at 14:40

## 2022-06-22 RX ADMIN — PANTOPRAZOLE SODIUM SCH MG: 20 TABLET, DELAYED RELEASE ORAL at 10:14

## 2022-06-22 RX ADMIN — NIFEDIPINE SCH MG: 60 TABLET, EXTENDED RELEASE ORAL at 11:04

## 2022-06-22 RX ADMIN — HYDROXYZINE PAMOATE SCH MG: 25 CAPSULE ORAL at 22:31

## 2022-06-22 RX ADMIN — METOPROLOL TARTRATE SCH MG: 25 TABLET, FILM COATED ORAL at 10:14

## 2022-06-22 RX ADMIN — METOPROLOL TARTRATE SCH MG: 25 TABLET, FILM COATED ORAL at 22:31

## 2022-06-22 RX ADMIN — TRAZODONE HYDROCHLORIDE SCH MG: 100 TABLET ORAL at 22:31

## 2022-06-22 RX ADMIN — HYDROXYZINE PAMOATE SCH MG: 25 CAPSULE ORAL at 17:44

## 2022-06-23 RX ADMIN — DRONEDARONE SCH MG: 400 TABLET, FILM COATED ORAL at 11:44

## 2022-06-23 RX ADMIN — PANTOPRAZOLE SODIUM SCH MG: 20 TABLET, DELAYED RELEASE ORAL at 10:06

## 2022-06-23 RX ADMIN — NIFEDIPINE SCH MG: 60 TABLET, EXTENDED RELEASE ORAL at 10:06

## 2022-06-23 RX ADMIN — GABAPENTIN SCH MG: 300 CAPSULE ORAL at 06:34

## 2022-06-23 RX ADMIN — HYDROXYZINE PAMOATE SCH MG: 25 CAPSULE ORAL at 06:34

## 2022-06-23 RX ADMIN — TRAZODONE HYDROCHLORIDE SCH MG: 100 TABLET ORAL at 22:27

## 2022-06-23 RX ADMIN — Medication SCH TAB: at 10:06

## 2022-06-23 RX ADMIN — GABAPENTIN SCH MG: 300 CAPSULE ORAL at 13:34

## 2022-06-23 RX ADMIN — ACETAMINOPHEN PRN MG: 325 TABLET ORAL at 22:28

## 2022-06-23 RX ADMIN — Medication SCH MG: at 22:27

## 2022-06-23 RX ADMIN — METHOCARBAMOL PRN MG: 500 TABLET ORAL at 10:07

## 2022-06-23 RX ADMIN — METOPROLOL TARTRATE SCH MG: 25 TABLET, FILM COATED ORAL at 22:27

## 2022-06-23 RX ADMIN — METOPROLOL TARTRATE SCH MG: 25 TABLET, FILM COATED ORAL at 10:06

## 2022-06-23 RX ADMIN — DRONEDARONE SCH MG: 400 TABLET, FILM COATED ORAL at 22:27

## 2022-06-23 RX ADMIN — GABAPENTIN SCH MG: 300 CAPSULE ORAL at 22:27

## 2022-06-24 RX ADMIN — DRONEDARONE SCH MG: 400 TABLET, FILM COATED ORAL at 22:14

## 2022-06-24 RX ADMIN — Medication SCH MG: at 22:13

## 2022-06-24 RX ADMIN — METOPROLOL TARTRATE SCH MG: 25 TABLET, FILM COATED ORAL at 22:13

## 2022-06-24 RX ADMIN — DRONEDARONE SCH MG: 400 TABLET, FILM COATED ORAL at 10:33

## 2022-06-24 RX ADMIN — ACETAMINOPHEN PRN MG: 325 TABLET ORAL at 17:42

## 2022-06-24 RX ADMIN — GABAPENTIN SCH MG: 300 CAPSULE ORAL at 06:03

## 2022-06-24 RX ADMIN — TRAZODONE HYDROCHLORIDE SCH MG: 100 TABLET ORAL at 22:13

## 2022-06-24 RX ADMIN — METOPROLOL TARTRATE SCH MG: 25 TABLET, FILM COATED ORAL at 10:34

## 2022-06-24 RX ADMIN — NIFEDIPINE SCH MG: 60 TABLET, EXTENDED RELEASE ORAL at 10:34

## 2022-06-24 RX ADMIN — IBUPROFEN PRN MG: 600 TABLET, FILM COATED ORAL at 22:14

## 2022-06-24 RX ADMIN — Medication SCH TAB: at 10:33

## 2022-06-24 RX ADMIN — PANTOPRAZOLE SODIUM SCH MG: 40 TABLET, DELAYED RELEASE ORAL at 10:36

## 2022-06-24 RX ADMIN — GABAPENTIN SCH MG: 300 CAPSULE ORAL at 22:13

## 2022-06-24 RX ADMIN — GABAPENTIN SCH MG: 300 CAPSULE ORAL at 14:47

## 2022-06-25 VITALS — SYSTOLIC BLOOD PRESSURE: 146 MMHG | TEMPERATURE: 98.3 F | HEART RATE: 69 BPM | DIASTOLIC BLOOD PRESSURE: 90 MMHG

## 2022-06-25 RX ADMIN — GABAPENTIN SCH MG: 300 CAPSULE ORAL at 06:09

## 2022-06-25 RX ADMIN — Medication SCH TAB: at 10:32

## 2022-06-25 RX ADMIN — DRONEDARONE SCH MG: 400 TABLET, FILM COATED ORAL at 10:32

## 2022-06-25 RX ADMIN — NIFEDIPINE SCH MG: 60 TABLET, EXTENDED RELEASE ORAL at 10:32

## 2022-06-25 RX ADMIN — METOPROLOL TARTRATE SCH MG: 25 TABLET, FILM COATED ORAL at 10:32

## 2022-06-25 RX ADMIN — PANTOPRAZOLE SODIUM SCH MG: 40 TABLET, DELAYED RELEASE ORAL at 10:32
